# Patient Record
Sex: FEMALE | Race: WHITE | NOT HISPANIC OR LATINO | Employment: FULL TIME | URBAN - METROPOLITAN AREA
[De-identification: names, ages, dates, MRNs, and addresses within clinical notes are randomized per-mention and may not be internally consistent; named-entity substitution may affect disease eponyms.]

---

## 2022-06-09 ENCOUNTER — OFFICE VISIT (OUTPATIENT)
Dept: URGENT CARE | Facility: PHYSICIAN GROUP | Age: 47
End: 2022-06-09
Payer: COMMERCIAL

## 2022-06-09 VITALS
SYSTOLIC BLOOD PRESSURE: 130 MMHG | OXYGEN SATURATION: 96 % | HEART RATE: 110 BPM | TEMPERATURE: 97.6 F | BODY MASS INDEX: 24.65 KG/M2 | WEIGHT: 182 LBS | HEIGHT: 72 IN | DIASTOLIC BLOOD PRESSURE: 80 MMHG | RESPIRATION RATE: 18 BRPM

## 2022-06-09 DIAGNOSIS — J45.21 MILD INTERMITTENT ASTHMA WITH ACUTE EXACERBATION: ICD-10-CM

## 2022-06-09 PROCEDURE — 94640 AIRWAY INHALATION TREATMENT: CPT | Performed by: INTERNAL MEDICINE

## 2022-06-09 PROCEDURE — 99213 OFFICE O/P EST LOW 20 MIN: CPT | Mod: 25 | Performed by: INTERNAL MEDICINE

## 2022-06-09 RX ORDER — DEXAMETHASONE SODIUM PHOSPHATE 4 MG/ML
4 INJECTION, SOLUTION INTRA-ARTICULAR; INTRALESIONAL; INTRAMUSCULAR; INTRAVENOUS; SOFT TISSUE ONCE
Status: COMPLETED | OUTPATIENT
Start: 2022-06-09 | End: 2022-06-09

## 2022-06-09 RX ORDER — NORETHINDRONE AND ETHINYL ESTRADIOL 7 DAYS X 3
KIT ORAL
COMMUNITY
Start: 2022-05-28

## 2022-06-09 RX ORDER — ALBUTEROL SULFATE 90 UG/1
2 AEROSOL, METERED RESPIRATORY (INHALATION) EVERY 6 HOURS PRN
Qty: 8.5 G | Refills: 0 | Status: SHIPPED | OUTPATIENT
Start: 2022-06-09

## 2022-06-09 RX ORDER — IPRATROPIUM BROMIDE AND ALBUTEROL SULFATE 2.5; .5 MG/3ML; MG/3ML
3 SOLUTION RESPIRATORY (INHALATION) ONCE
Status: COMPLETED | OUTPATIENT
Start: 2022-06-09 | End: 2022-06-09

## 2022-06-09 RX ORDER — PREDNISONE 10 MG/1
TABLET ORAL
Qty: 34 TABLET | Refills: 0 | Status: SHIPPED | OUTPATIENT
Start: 2022-06-09

## 2022-06-09 RX ADMIN — DEXAMETHASONE SODIUM PHOSPHATE 4 MG: 4 INJECTION, SOLUTION INTRA-ARTICULAR; INTRALESIONAL; INTRAMUSCULAR; INTRAVENOUS; SOFT TISSUE at 10:00

## 2022-06-09 RX ADMIN — IPRATROPIUM BROMIDE AND ALBUTEROL SULFATE 3 ML: 2.5; .5 SOLUTION RESPIRATORY (INHALATION) at 10:01

## 2022-06-09 ASSESSMENT — ENCOUNTER SYMPTOMS
WHEEZING: 1
SPUTUM PRODUCTION: 0
SHORTNESS OF BREATH: 1
COUGH: 0
PALPITATIONS: 0
HEMOPTYSIS: 0

## 2022-06-09 NOTE — PROGRESS NOTES
Chief Complaint:      HPI:      Shelby Stanley is a 47 y.o. female with history of allergy induced asthma.  She is moving from her home to an apartment and with all the moving her asthma was triggered by dust and pollen.  She reports wheezing and mild shortness of breath with exertion.  She denies shortness of breath at rest.  She denies fever, chills and sick contacts.  She is requesting a refill of her albuterol.           ROS:   Review of Systems   Respiratory: Positive for shortness of breath and wheezing. Negative for cough, hemoptysis and sputum production.    Cardiovascular: Negative for chest pain and palpitations.        Past Medical History:  She There are no problems to display for this patient.    Past Surgical History:  She History reviewed. No pertinent surgical history.   Allergies:  She Sulfa drugs   Current Medications:  She   Current Outpatient Medications:   •  PIRMELLA 7/7/7 0.5/0.75/1-35 MG-MCG per tablet, , Disp: , Rfl:   •  Loratadine-Pseudoephedrine (CLARITIN-D 12 HOUR PO), Take  by mouth., Disp: , Rfl:   •  predniSONE (DELTASONE) 10 MG Tab, Day 1-2 60mg, Day 3-5 40mg, Day 6-8 20mg, Day 9-12 10mg., Disp: 34 Tablet, Rfl: 0  •  albuterol 108 (90 Base) MCG/ACT Aero Soln inhalation aerosol, Inhale 2 Puffs every 6 hours as needed for Shortness of Breath., Disp: 8.5 g, Rfl: 0  •  ISOtretinoin (CLARAVIS PO), Take  by mouth. (Patient not taking: Reported on 6/9/2022), Disp: , Rfl:     Current Facility-Administered Medications:   •  dexamethasone (DECADRON) injection 4 mg, 4 mg, Oral, Once, Deandre Marin M.D.  •  ipratropium-albuterol (DUONEB) nebulizer solution, 3 mL, Nebulization, Once, Deandre Marin M.D.  Social History:  She   Social History     Socioeconomic History   • Marital status:      Spouse name: Not on file   • Number of children: Not on file   • Years of education: Not on file   • Highest education level: Not on file   Occupational History   • Not on file   Tobacco Use    • Smoking status: Never Smoker   • Smokeless tobacco: Never Used   Vaping Use   • Vaping Use: Never used   Substance and Sexual Activity   • Alcohol use: Yes     Comment: occasionally   • Drug use: Never   • Sexual activity: Not on file   Other Topics Concern   • Not on file   Social History Narrative   • Not on file     Social Determinants of Health     Financial Resource Strain: Not on file   Food Insecurity: Not on file   Transportation Needs: Not on file   Physical Activity: Not on file   Stress: Not on file   Social Connections: Not on file   Intimate Partner Violence: Not on file   Housing Stability: Not on file      Family History:   Her History reviewed. No pertinent family history.     PHYSICAL EXAM:    Vital signs: /80   Pulse (!) 110   Temp 36.4 °C (97.6 °F) (Temporal)   Resp 18   Ht 1.829 m (6')   Wt 82.6 kg (182 lb)   SpO2 96%   Breastfeeding No   BMI 24.68 kg/m²   Physical Exam  Constitutional:       Appearance: She is normal weight.   HENT:      Head: Normocephalic and atraumatic.      Mouth/Throat:      Mouth: Mucous membranes are moist.      Pharynx: Oropharynx is clear.   Eyes:      Conjunctiva/sclera: Conjunctivae normal.      Pupils: Pupils are equal, round, and reactive to light.   Cardiovascular:      Rate and Rhythm: Normal rate and regular rhythm.      Pulses: Normal pulses.      Heart sounds: Normal heart sounds.   Pulmonary:      Effort: Pulmonary effort is normal.      Breath sounds: Wheezing present.   Neurological:      Mental Status: She is alert.         Labs:  No results found for: HBA1C   No results found for: CHOLSTRLTOT, TRIGLYCERIDE, HDL, LDL, CHOLHDLRAT, NONHDL  No results found for: MICROALBCALC, MALBCRT, MALBEXCR, MOACAK04, MICROALBUR, MICRALB, UMICROALBUM, MICROALBTIM   No results found for: CREATININE        ASSESSMENT/PLAN:   1. Mild intermittent asthma with acute exacerbation  Dexamethasone 4 mg p.o. was given to the patient  DuoNeb treatment given  The  patient improved after treatment  Start burst steroids (prednisone) with  taper  Refilled albuterol  Reviewed ER precautions with patient  Return to urgent care if symptoms do not improve    - dexamethasone (DECADRON) injection 4 mg  - ipratropium-albuterol (DUONEB) nebulizer solution  - predniSONE (DELTASONE) 10 MG Tab; Day 1-2 60mg, Day 3-5 40mg, Day 6-8 20mg, Day 9-12 10mg.  Dispense: 34 Tablet; Refill: 0  - albuterol 108 (90 Base) MCG/ACT Aero Soln inhalation aerosol; Inhale 2 Puffs every 6 hours as needed for Shortness of Breath.  Dispense: 8.5 g; Refill: 0      Return if symptoms worsen or fail to improve.      This patient during there office visit was started on new medication.  Side effects of new medications were discussed with the patient today in the office. The patient was supplied paperwork on this new medication.    Red flags discussed and when to RTC or seek care in the ER  Supportive care, differential diagnoses, and indications for immediate follow-up discussed with patient.    Pathogenesis of diagnosis discussed including typical length and natural progression.       Instructed to return to  or nearest emergency department if symptoms fail to improve, for any change in condition, further concerns, or new concerning symptoms.  Patient states understanding of the plan of care and discharge instructions.      Deandre Marin MD, FACE, [facility]  06/09/22